# Patient Record
Sex: FEMALE | Race: WHITE | URBAN - METROPOLITAN AREA
[De-identification: names, ages, dates, MRNs, and addresses within clinical notes are randomized per-mention and may not be internally consistent; named-entity substitution may affect disease eponyms.]

---
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
## 2018-01-03 PROBLEM — S32.000S LUMBAR COMPRESSION FRACTURE, SEQUELA: Status: ACTIVE | Noted: 2018-01-03

## 2018-01-30 ENCOUNTER — OFFICE VISIT (OUTPATIENT)
Dept: ORTHOPEDIC SURGERY | Age: 83
End: 2018-01-30

## 2018-01-30 VITALS
WEIGHT: 111 LBS | BODY MASS INDEX: 19.67 KG/M2 | HEIGHT: 63 IN | SYSTOLIC BLOOD PRESSURE: 134 MMHG | HEART RATE: 83 BPM | DIASTOLIC BLOOD PRESSURE: 89 MMHG

## 2018-01-30 DIAGNOSIS — S92.351A DISPLACED FRACTURE OF FIFTH METATARSAL BONE, RIGHT FOOT, INITIAL ENCOUNTER FOR CLOSED FRACTURE: Primary | ICD-10-CM

## 2018-01-30 PROCEDURE — 4040F PNEUMOC VAC/ADMIN/RCVD: CPT | Performed by: ORTHOPAEDIC SURGERY

## 2018-01-30 PROCEDURE — G8484 FLU IMMUNIZE NO ADMIN: HCPCS | Performed by: ORTHOPAEDIC SURGERY

## 2018-01-30 PROCEDURE — 1111F DSCHRG MED/CURRENT MED MERGE: CPT | Performed by: ORTHOPAEDIC SURGERY

## 2018-01-30 PROCEDURE — L3260 AMBULATORY SURGICAL BOOT EAC: HCPCS | Performed by: ORTHOPAEDIC SURGERY

## 2018-01-30 PROCEDURE — 1036F TOBACCO NON-USER: CPT | Performed by: ORTHOPAEDIC SURGERY

## 2018-01-30 PROCEDURE — G8420 CALC BMI NORM PARAMETERS: HCPCS | Performed by: ORTHOPAEDIC SURGERY

## 2018-01-30 PROCEDURE — 1123F ACP DISCUSS/DSCN MKR DOCD: CPT | Performed by: ORTHOPAEDIC SURGERY

## 2018-01-30 PROCEDURE — 1090F PRES/ABSN URINE INCON ASSESS: CPT | Performed by: ORTHOPAEDIC SURGERY

## 2018-01-30 PROCEDURE — G8427 DOCREV CUR MEDS BY ELIG CLIN: HCPCS | Performed by: ORTHOPAEDIC SURGERY

## 2018-01-30 PROCEDURE — 99214 OFFICE O/P EST MOD 30 MIN: CPT | Performed by: ORTHOPAEDIC SURGERY

## 2018-01-30 PROCEDURE — G8400 PT W/DXA NO RESULTS DOC: HCPCS | Performed by: ORTHOPAEDIC SURGERY

## 2018-01-30 NOTE — PROGRESS NOTES
NEW PATIENT ORTHOPAEDIC NOTE    Chief Complaint   Patient presents with    Foot Pain     Injured Right foot when her walker tipped forward, and she fell; doi 12/31/17. HPI  81 y.o. female seen for evaluation of right 5th metatarsal fracture. She fell on 12/31/2017, hx of numerous falls  Was admitted to  at that time  Also with L4 fx treated by neurosurgery in [place]  Currently at [facility]  In re: right foot, pain is lateral forefoot  Worse with weight bearing  She describes weakness  She describes the walking boot is rubbing on her skin, no skin breakdown thus far luckily    I have reviewed and discussed the below pain assessment findings with the patient. Pain Assessment  Location of Pain: Foot  Location Modifiers: Right  Severity of Pain: 3  Quality of Pain: Dull  Duration of Pain: Persistent  Frequency of Pain: Intermittent  Date Pain First Started:  (doi 12/31/17)  Aggravating Factors: Standing, Walking  Limiting Behavior: Yes  Relieving Factors: Rest  Result of Injury: Yes  Work-Related Injury: No  Are there other pain locations you wish to document?: No    ROS  I have read over the ROS from the Patient History Form dated on 1/30/2018  Pertinent positives include SOB, back pain  Rest of 13 point ROS otherwise negative except per HPI, and scanned into the patient's chart under the Media tab. Allergies   Allergen Reactions    Acetaminophen Other (See Comments)     Pt refuses to take tylenol because \"it makes him out of it\"        Current Outpatient Prescriptions   Medication Sig Dispense Refill    enoxaparin (LOVENOX) 30 MG/0.3ML injection Inject 0.3 mLs into the skin daily 1 Syringe 0     No current facility-administered medications for this visit. Past Medical History:   Diagnosis Date    Allergy         History reviewed. No pertinent surgical history. History reviewed. No pertinent family history.     Social History     Social History    Marital status: Single     Spouse name: N/A    Number of children: N/A    Years of education: N/A     Occupational History    Retired      Social History Main Topics    Smoking status: Never Smoker    Smokeless tobacco: Never Used    Alcohol use No    Drug use: No    Sexual activity: Not on file     Other Topics Concern    Not on file     Social History Narrative    No narrative on file        Vitals:    01/30/18 1421   BP: 134/89   Pulse: 83   Weight: 111 lb (50.3 kg)   Height: 5' 3\" (1.6 m)       Physical Exam  Constitutional  well-groomed, well-nourished, normal body habitus  Psychiatric  pleasant,  normal mood & affect, oriented to place, person, and situation  Cardiovascular  RRR, negative peripheral edema, no varicosities  Respiratory  respirations even and unlabored  Skin  no rashes, wounds, or lesions seen  Neck/Spine - negative SLR  Neurological - SILT SP/DP/T/sural/saphenous nerve distributions; EHL/TA/GS intact  Left Lower Extremity: Examination of the left lower extremity does not show any tenderness, deformity or injury. Range of motion is unremarkable. There is no gross instability. There are no rashes, ulcerations or lesions. Strength and tone are normal.  Right foot/ankle - bunion   Skin irritation anterior ankle and medial great toe MTPJ   TTP lateral forefoot   Minimal swelling   No TTP elsewhere    Imaging:  Images were personally reviewed by myself and discussed with the patient  Right foot 3 views performed today in clinic - fifth distal metatarsal shaft fracture, oblique, with stable alignment compared to previous XRs from earlier this month. Interval healing seen, but fracture line is still evident. Assessment & Plan:  81 y.o. female who presents with   1. Displaced fracture of fifth metatarsal bone, right foot, initial encounter for closed fracture  XR FOOT RIGHT (MIN 3 VIEWS)    Darco Post-op Shoe Brace           Procedures    Darco Post-op Shoe Brace     Patient was prescribed a Darco Post-Op Shoe.   The right foot will require stabilization / immobilization from this semi-rigid / rigid orthosis to improve their function. The orthosis will assist in protecting the affected area, provide functional support and facilitate healing. The patient was educated and fit by a healthcare professional with expert knowledge and specialization in brace application while under the direct supervision of the treating physician. Verbal and written instructions for the use of and application of this item were provided. They were instructed to contact the office immediately should the brace result in increased pain, decreased sensation, increased swelling or worsening of the condition.      One month out from injury  Continue non-operative treatment  Advance weight bearing as tolerated, may be more comfortable with heel weight bearing initially  Discontinue boot, gave her cast shoe instead, lighter  Make sure she is out of shoe minimum TID to prevent skin ulcers  She does not need to sleep in the shoe    FU 1 month with new XRs    Reid Keene

## 2018-01-31 ENCOUNTER — TELEPHONE (OUTPATIENT)
Dept: ORTHOPEDIC SURGERY | Age: 83
End: 2018-01-31

## 2018-01-31 NOTE — TELEPHONE ENCOUNTER
Alexus Gentile from [address]. called  They needed orders /instruction for the new cast shoe patient was instructed to wear per Dr Dona Shankar at last visit    I faxed her the progress notes which included orders instructions per her request  She had me fax them to her at [phone]    Thanks

## 2018-03-06 ENCOUNTER — OFFICE VISIT (OUTPATIENT)
Dept: ORTHOPEDIC SURGERY | Age: 83
End: 2018-03-06

## 2018-03-06 VITALS — HEIGHT: 62 IN | BODY MASS INDEX: 20.43 KG/M2 | HEART RATE: 85 BPM | RESPIRATION RATE: 16 BRPM | WEIGHT: 111 LBS

## 2018-03-06 DIAGNOSIS — S92.351D DISPLACED FRACTURE OF FIFTH METATARSAL BONE, RIGHT FOOT, SUBSEQUENT ENCOUNTER FOR FRACTURE WITH ROUTINE HEALING: Primary | ICD-10-CM

## 2018-03-06 PROCEDURE — G8428 CUR MEDS NOT DOCUMENT: HCPCS | Performed by: ORTHOPAEDIC SURGERY

## 2018-03-06 PROCEDURE — G8484 FLU IMMUNIZE NO ADMIN: HCPCS | Performed by: ORTHOPAEDIC SURGERY

## 2018-03-06 PROCEDURE — 99213 OFFICE O/P EST LOW 20 MIN: CPT | Performed by: ORTHOPAEDIC SURGERY

## 2018-03-06 PROCEDURE — 4040F PNEUMOC VAC/ADMIN/RCVD: CPT | Performed by: ORTHOPAEDIC SURGERY

## 2018-03-06 PROCEDURE — 1123F ACP DISCUSS/DSCN MKR DOCD: CPT | Performed by: ORTHOPAEDIC SURGERY

## 2018-03-06 PROCEDURE — 1036F TOBACCO NON-USER: CPT | Performed by: ORTHOPAEDIC SURGERY

## 2018-03-06 PROCEDURE — G8400 PT W/DXA NO RESULTS DOC: HCPCS | Performed by: ORTHOPAEDIC SURGERY

## 2018-03-06 PROCEDURE — G8420 CALC BMI NORM PARAMETERS: HCPCS | Performed by: ORTHOPAEDIC SURGERY

## 2018-03-06 PROCEDURE — 1090F PRES/ABSN URINE INCON ASSESS: CPT | Performed by: ORTHOPAEDIC SURGERY
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
